# Patient Record
Sex: MALE | Race: WHITE | Employment: UNEMPLOYED | URBAN - METROPOLITAN AREA
[De-identification: names, ages, dates, MRNs, and addresses within clinical notes are randomized per-mention and may not be internally consistent; named-entity substitution may affect disease eponyms.]

---

## 2019-01-07 ENCOUNTER — OFFICE VISIT (OUTPATIENT)
Dept: FAMILY MEDICINE CLINIC | Age: 19
End: 2019-01-07

## 2019-01-07 VITALS
SYSTOLIC BLOOD PRESSURE: 132 MMHG | TEMPERATURE: 98.2 F | OXYGEN SATURATION: 98 % | WEIGHT: 315 LBS | RESPIRATION RATE: 18 BRPM | BODY MASS INDEX: 46.65 KG/M2 | DIASTOLIC BLOOD PRESSURE: 90 MMHG | HEART RATE: 104 BPM | HEIGHT: 69 IN

## 2019-01-07 DIAGNOSIS — G44.52 NEW PERSISTENT DAILY HEADACHE: Primary | ICD-10-CM

## 2019-01-07 DIAGNOSIS — Z23 ENCOUNTER FOR IMMUNIZATION: ICD-10-CM

## 2019-01-07 DIAGNOSIS — Z87.898 HISTORY OF SNORING: ICD-10-CM

## 2019-01-07 DIAGNOSIS — Z13.220 SCREENING, LIPID: ICD-10-CM

## 2019-01-07 DIAGNOSIS — E66.01 MORBID OBESITY (HCC): ICD-10-CM

## 2019-01-07 DIAGNOSIS — Z13.1 SCREENING FOR DIABETES MELLITUS: ICD-10-CM

## 2019-01-07 RX ORDER — TRAZODONE HYDROCHLORIDE 100 MG/1
TABLET ORAL
Refills: 1 | COMMUNITY
Start: 2018-11-21 | End: 2020-06-02

## 2019-01-07 RX ORDER — SERTRALINE HYDROCHLORIDE 50 MG/1
TABLET, FILM COATED ORAL
Refills: 1 | COMMUNITY
Start: 2018-11-21

## 2019-01-07 NOTE — PATIENT INSTRUCTIONS

## 2019-01-07 NOTE — PROGRESS NOTES
Chief Complaint Patient presents with Benja Prior Establish Care 1. Have you been to the ER, urgent care clinic since your last visit? Hospitalized since your last visit? No 
 
2. Have you seen or consulted any other health care providers outside of the [address] [place] [address] since your last visit? Include any pap smears or colon screening.  No

## 2019-01-07 NOTE — PROGRESS NOTES
HPI:  
Trung Lowry is a 22 y.o. male who presents to [facility]. Headache This patient is a 22 y.o. male with a chief complaint of headache. The patient has had history of headaches. The headache is typical of previous headaches. The patient says that the headache was gradual  in onset. The pain stayed. . The pain is moderate. The pain istemporal and is a persistant throbbing. The pain does not migrate. The headache is accompanied by photophobia and phonophobia. The headaches usually last hours. They are triggered by stress. Thereis not a family history of headaches. There is not a family history of cerebral aneurysm or SAH. Worse in the summer. Paternal Grandmother had a history of migraines. He is followed by [facility] psychiatric care for history of bipolar disorder. Has not been hospitalized since 2016 for bipolar disorder. He is currently in independent living. He is looking for work. He is currently smoking vapor cigarettes. He is not on a particular diet. He is not exercising. He reports a picky diet heavy in processed food and soda. He is accompanied by a  today. Current Outpatient Medications Medication Sig Dispense Refill  traZODone (DESYREL) 100 mg tablet TAKE 1 TABLET BY MOUTH TWICE A DAY  1  
 sertraline (ZOLOFT) 50 mg tablet TAKE 1 TABLET BY MOUTH EVERY DAY  1 No Known Allergies Patient Active Problem List  
Diagnosis Code  Psychiatric problem F99  
 Otitis media H66.90  Obesity, morbid ([facility] [address] E66.01 Past Medical History:  
Diagnosis Date  Otitis media  Psychiatric problem No past surgical history on file. No LMP for male patient. Family History Problem Relation Age of Onset  Migraines Paternal Grandmother Social History Socioeconomic History  Marital status: SINGLE Spouse name: Not on file  Number of children: Not on file  Years of education: Not on file  Highest education level: Not on file Social Needs  Financial resource strain: Not on file  Food insecurity - worry: Not on file  Food insecurity - inability: Not on file  Transportation needs - medical: Not on file  Transportation needs - non-medical: Not on file Occupational History  Not on file Tobacco Use  Smoking status: Current Some Day Smoker  Smokeless tobacco: Never Used  Tobacco comment: vap Substance and Sexual Activity  Alcohol use: No  
 Drug use: No  
 Sexual activity: No  
Other Topics Concern  Not on file Social History Narrative  Not on file ROS:  
Review of Systems Constitutional: Negative for fever, malaise/fatigue and weight loss. HENT: Negative for hearing loss. Eyes: Negative for blurred vision and pain. Respiratory: Negative for cough and shortness of breath. Cardiovascular: Negative for chest pain, palpitations and leg swelling. Gastrointestinal: Negative for abdominal pain, blood in stool, constipation, diarrhea and melena. Genitourinary: Negative for dysuria and hematuria. Musculoskeletal: Negative for joint pain. Skin: Negative for rash. Neurological: Negative for headaches. Psychiatric/Behavioral: Negative for depression. The patient is not nervous/anxious and does not have insomnia. Physical Exam:  
 
Visit Vitals /90 (BP 1 Location: Left arm, BP Patient Position: Sitting) Pulse (!) 104 Temp 98.2 °F (36.8 °C) (Oral) Resp 18 Ht 5' 9\" (1.753 m) Wt (!) 411 lb (186.4 kg) SpO2 98% BMI 60.69 kg/m² Vitals and Nurse Documentation reviewed. Physical Exam  
Constitutional: No distress. Morbidly obese. HENT:  
Right Ear: Tympanic membrane is not erythematous and not bulging. No middle ear effusion. Left Ear: Tympanic membrane is not erythematous and not bulging. No middle ear effusion. Nose: No rhinorrhea.  Right sinus exhibits no maxillary sinus tenderness and no frontal sinus tenderness. Left sinus exhibits no maxillary sinus tenderness and no frontal sinus tenderness. Mouth/Throat: No oropharyngeal exudate or posterior oropharyngeal erythema. Eyes: EOM and lids are normal.  
Cardiovascular: S1 normal and S2 normal. Exam reveals no gallop and no friction rub. No murmur heard. Pulmonary/Chest: Breath sounds normal. He has no wheezes. Lymphadenopathy:  
  He has no cervical adenopathy. Skin: Skin is warm and dry. Psychiatric: Mood and affect normal.  
 
 
 
Assessment/ Plan:  
[employer] were discussed including hours of operation, on-call providers, and MyChart. Diagnoses and all orders for this visit: 1. New persistent daily headache -     METABOLIC PANEL, COMPREHENSIVE Many lifestyle factors that we discussed improving including a reduction in caffeine and nicotine. Sleep study pending. 2. History of snoring 
-     SLEEP MEDICINE REFERRALfor evaluation of ROSANNA. 3. Screening for diabetes mellitus 
-     HEMOGLOBIN A1C WITH EAG 4. Screening, lipid -     LIPID PANEL 5. Morbid obesity ([facility] [address] -     METABOLIC PANEL, COMPREHENSIVE I have reviewed/discussed the above normal BMI with the patient. I have recommended the following interventions: dietary management education, guidance, and counseling, encourage exercise, monitor weight and prescribed dietary intake. Given written instructions as well. 6. Encounter for immunization 
-     INFLUENZA VIRUS VAC QUAD,SPLIT,PRESV FREE SYRINGE IM 
-     HUMAN PAPILLOMA VIRUS NONAVALENT HPV 3 DOSE IM (GARDASIL 9) -     [place] IMMUNIZ ADMIN,1 SINGLE/COMB VAC/TOXOID Discussed expected course/resolution/complications of diagnosis in detail with patient.   
Medication risks/benefits/costs/interactions/alternatives discussed with patient.   
Pt was given an after visit summary which includes diagnoses, current medications & vitals.   
Pt expressed understanding with the diagnosis and plan Follow-up Disposition: 
Return in about 8 weeks (around 3/4/2019).

## 2019-03-18 ENCOUNTER — OFFICE VISIT (OUTPATIENT)
Dept: FAMILY MEDICINE CLINIC | Age: 19
End: 2019-03-18

## 2019-03-18 VITALS
TEMPERATURE: 98.3 F | RESPIRATION RATE: 16 BRPM | HEART RATE: 98 BPM | HEIGHT: 69 IN | SYSTOLIC BLOOD PRESSURE: 141 MMHG | BODY MASS INDEX: 46.65 KG/M2 | OXYGEN SATURATION: 97 % | WEIGHT: 315 LBS | DIASTOLIC BLOOD PRESSURE: 84 MMHG

## 2019-03-18 DIAGNOSIS — M54.50 ACUTE MIDLINE LOW BACK PAIN WITHOUT SCIATICA: Primary | ICD-10-CM

## 2019-03-18 DIAGNOSIS — E66.01 OBESITY, MORBID (HCC): ICD-10-CM

## 2019-03-18 RX ORDER — DICLOFENAC SODIUM 75 MG/1
75 TABLET, DELAYED RELEASE ORAL
Qty: 30 TAB | Refills: 0 | Status: SHIPPED | OUTPATIENT
Start: 2019-03-18

## 2019-03-18 NOTE — PROGRESS NOTES
Morgan Espinoza is a 22 y.o. male    Exam Rm: 28    Chief Complaint   Patient presents with    Back Pain     Pt is here for a f/u for back pain. 1. Have you been to the ER, urgent care clinic since your last visit? Hospitalized since your last visit? No     2. Have you seen or consulted any other health care providers outside of the [address] [place] [address] since your last visit? Include any pap smears or colon screening.   No      Health Maintenance Due   Topic Date Due    DTaP/Tdap/Td series (1 - Tdap) 10/06/2006    Pneumococcal 19-64 Medium Risk (1 of 1 - PPSV23) 10/06/2018       Visit Vitals  /84 (BP 1 Location: Left arm, BP Patient Position: Sitting)   Pulse 98   Temp 98.3 °F (36.8 °C) (Oral)   Resp 16   Ht 5' 9\" (1.753 m)   Wt (!) 432 lb (196 kg)   SpO2 97%   BMI 63.80 kg/m²

## 2019-03-18 NOTE — PROGRESS NOTES
HISTORY OF PRESENT ILLNESS  Shirley Mccray is a 22 y.o. male. HPI  Accompanied by aid for evaluation of low back pain. Reports midline low back pain on and off for several months. Aggravated by prolonged standing, works at [employer], has to stand for 4 hours/day. Sx relieved with sitting/lying down. Tried icy hot patches with little sx relief. Obese. Past medical history, social history, family history and medications were reviewed and updated. Blood pressure 141/84, pulse 98, temperature 98.3 °F (36.8 °C), temperature source Oral, resp. rate 16, height 5' 9\" (1.753 m), weight (!) 432 lb (196 kg), SpO2 97 %. Review of Systems   Musculoskeletal: Positive for back pain. Neurological: Negative for tingling and sensory change. All other systems reviewed and are negative. Physical Exam   Constitutional:   Obese. Musculoskeletal:        Lumbar back: He exhibits tenderness (lumbar sacral region). He exhibits no edema. Neurological: He has normal strength. No sensory deficit. Negative SLR testing. ASSESSMENT and PLAN  Diagnoses and all orders for this visit:    1. Acute midline low back pain without sciatica  -     diclofenac EC (VOLTAREN) 75 mg EC tablet; Take 1 Tab by mouth two (2) times daily as needed for Pain. Moist heat to affected area tid x 20 minutes. Avoid strenuous lifting, pushing, pulling. Avoid twisting and bending. Limit prolonged standing  Low back stretching, stabilization and strengthening exercises reviewed-handout given. Voltaren as above. Medication risks, benefits and potential side effects were reviewed. Topical analgesia as directed. Consider PT if sx worsen or FTI. 2. Obesity, morbid ([address]  Weight loss recommendations given. Strongly advised walking x 20 minutes 3-4x week.

## 2019-03-18 NOTE — LETTER
NOTIFICATION RETURN TO WORK / SCHOOL 
 
3/18/2019 4:06 PM 
 
Mr. Ella Rodriguez [address] [facility]. [address] [facility] [address] To Whom It May Concern: 
 
Ella Rodriguez is currently under the care of [facility]. It is recommended that he work no more than 2 consecutive days due to back problem. If there are questions or concerns please have the patient contact our office.  
 
 
 
Sincerely, 
 
 
Bea Green, NP

## 2019-03-18 NOTE — PATIENT INSTRUCTIONS

## 2019-04-30 ENCOUNTER — OFFICE VISIT (OUTPATIENT)
Dept: SLEEP MEDICINE | Age: 19
End: 2019-04-30

## 2019-04-30 VITALS
HEIGHT: 69 IN | HEART RATE: 101 BPM | BODY MASS INDEX: 46.65 KG/M2 | WEIGHT: 315 LBS | DIASTOLIC BLOOD PRESSURE: 77 MMHG | SYSTOLIC BLOOD PRESSURE: 114 MMHG | OXYGEN SATURATION: 98 %

## 2019-04-30 DIAGNOSIS — Z86.59 H/O: DEPRESSION: ICD-10-CM

## 2019-04-30 DIAGNOSIS — E66.2 HYPOVENTILATION ASSOCIATED WITH OBESITY (HCC): ICD-10-CM

## 2019-04-30 DIAGNOSIS — G47.33 OSA (OBSTRUCTIVE SLEEP APNEA): Primary | ICD-10-CM

## 2019-04-30 NOTE — PATIENT INSTRUCTIONS
[address] [facility] [place]. [place], [address]  Tel.  [phone]  Fax. [address] [facility] [address]  [place], [address] [facility]  Tel.  [phone]  Fax. [phone] [address]  Tel.  [phone]  Fax. [phone]     Sleep Apnea: After Your Visit  Your Care Instructions  Sleep apnea occurs when you frequently stop breathing for 10 seconds or longer during sleep. It can be mild to severe, based on the number of times per hour that you stop breathing or have slowed breathing. Blocked or narrowed airways in your nose, mouth, or throat can cause sleep apnea. Your airway can become blocked when your throat muscles and tongue relax during sleep. Sleep apnea is common, occurring in 1 out of 20 individuals. Individuals having any of the following characteristics should be evaluated and treated right away due to high risk and detrimental consequences from untreated sleep apnea:  1. Obesity  2. Congestive Heart failure  3. Atrial Fibrillation  4. Uncontrolled Hypertension  5. Type II Diabetes  6. Night-time Arrhythmias  7. Stroke  8. Pulmonary Hypertension  9. High-risk Driving Populations (pilots, truck drivers, etc.)  10. Patients Considering Weight-loss Surgery    How do you know you have sleep apnea? You probably have sleep apnea if you answer 'yes' to 3 or more of the following questions:  S - Have you been told that you Snore? T - Are you often Tired during the day? O - Has anyone Observed you stop breathing while sleeping? P- Do you have (or are being treated for) high blood Pressure? B - Are you obese (Body Mass Index > 35)? A - Is your Age 48years old or older? N - Is your Neck size greater than 16 inches? G - Are you male Gender? A sleep physician can prescribe a breathing device that prevents tissues in the throat from blocking your airway.  Or your doctor may recommend using a dental device (oral breathing device) to help keep your airway open. In some cases, surgery may be needed to remove enlarged tissues in the throat. Follow-up care is a key part of your treatment and safety. Be sure to make and go to all appointments, and call your doctor if you are having problems. It's also a good idea to know your test results and keep a list of the medicines you take. How can you care for yourself at home? · Lose weight, if needed. It may reduce the number of times you stop breathing or have slowed breathing. · Go to bed at the same time every night. · Sleep on your side. It may stop mild apnea. If you tend to roll onto your back, sew a pocket in the back of your pajama top. Put a tennis ball into the pocket, and stitch the pocket shut. This will help keep you from sleeping on your back. · Avoid alcohol and medicines such as sleeping pills and sedatives before bed. · Do not smoke. Smoking can make sleep apnea worse. If you need help quitting, talk to your doctor about stop-smoking programs and medicines. These can increase your chances of quitting for good. · Prop up the head of your bed 4 to 6 inches by putting bricks under the legs of the bed. · Treat breathing problems, such as a stuffy nose, caused by a cold or allergies. · Use a continuous positive airway pressure (CPAP) breathing machine if lifestyle changes do not help your apnea and your doctor recommends it. The machine keeps your airway from closing when you sleep. · If CPAP does not help you, ask your doctor whether you should try other breathing machines. A bilevel positive airway pressure machine has two types of air pressureâone for breathing in and one for breathing out. Another device raises or lowers air pressure as needed while you breathe. · If your nose feels dry or bleeds when using one of these machines, talk with your doctor about increasing moisture in the air. A humidifier may help.   · If your nose is runny or stuffy from using a breathing machine, talk with your doctor about using decongestants or a corticosteroid nasal spray. When should you call for help? Watch closely for changes in your health, and be sure to contact your doctor if:  · You still have sleep apnea even though you have made lifestyle changes. · You are thinking of trying a device such as CPAP. · You are having problems using a CPAP or similar machine. Where can you learn more? Go to Wingz. Enter V280 in the search box to learn more about \"Sleep Apnea: After Your Visit. \"   © 5746-7186 Healthwise, Incorporated. Care instructions adapted under license by [employer] (which disclaims liability or warranty for this information). This care instruction is for use with your licensed healthcare professional. If you have questions about a medical condition or this instruction, always ask your healthcare professional. Armida Lawson any warranty or liability for your use of this information. PROPER SLEEP HYGIENE    What to avoid  · Do not have drinks with caffeine, such as coffee or black tea, for 8 hours before bed. · Do not smoke or use other types of tobacco near bedtime. Nicotine is a stimulant and can keep you awake. · Avoid drinking alcohol late in the evening, because it can cause you to wake in the middle of the night. · Do not eat a big meal close to bedtime. If you are hungry, eat a light snack. · Do not drink a lot of water close to bedtime, because the need to urinate may wake you up during the night. · Do not read or watch TV in bed. Use the bed only for sleeping and sexual activity. What to try  · Go to bed at the same time every night, and wake up at the same time every morning. Do not take naps during the day. · Keep your bedroom quiet, dark, and cool. · Get regular exercise, but not within 3 to 4 hours of your bedtime. .  · Sleep on a comfortable pillow and mattress.   · If watching the clock makes you anxious, turn it facing away from you so you cannot see the time. · If you worry when you lie down, start a worry book. Well before bedtime, write down your worries, and then set the book and your concerns aside. · Try meditation or other relaxation techniques before you go to bed. · If you cannot fall asleep, get up and go to another room until you feel sleepy. Do something relaxing. Repeat your bedtime routine before you go to bed again. · Make your house quiet and calm about an hour before bedtime. Turn down the lights, turn off the TV, log off the computer, and turn down the volume on music. This can help you relax after a busy day. Drowsy Driving  The [address] [place] [address] Road Traffic Safety Administration cites drowsiness as a causing factor in more than 589,520 police reported crashes annually, resulting in 76,000 injuries and 1,500 deaths. Other surveys suggest 55% of people polled have driven while drowsy in the past year, 23% had fallen asleep but not crashed, 3% crashed, and 2% had and accident due to drowsy driving. Who is at risk? Young Drivers: One study of drowsy driving accidents states that 55% of the drivers were under 25 years. Of those, 75% were male. Shift Workers and Travelers: People who work overnight or travel across time zones frequently are at higher risk of experiencing Circadian Rhythm Disorders. They are trying to work and function when their body is programed to sleep. Sleep Deprived: Lack of sleep has a serious impact on your ability to pay attention or focus on a task. Consistently getting less than the average of 8 hours your body needs creates partial or cumulative sleep deprivation. Untreated Sleep Disorders: Sleep Apnea, Narcolepsy, R.L.S., and other sleep disorders (untreated) prevent a person from getting enough restful sleep. This leads to excessive daytime sleepiness and increases the risk for drowsy driving accidents by up to 7 times.   Medications / Alcohol: Even over the counter medications can cause drowsiness. Medications that impair a drivers attention should have a warning label. Alcohol naturally makes you sleepy and on its own can cause accidents. Combined with excessive drowsiness its effects are amplified. Signs of Drowsy Driving:   * You don't remember driving the last few miles   * You may drift out of your nahomi   * You are unable to focus and your thoughts wander   * You may yawn more often than normal   * You have difficulty keeping your eyes open / nodding off   * Missing traffic signs, speeding, or tailgating  Prevention-   Good sleep hygiene, lifestyle and behavioral choices have the most impact on drowsy driving. There is no substitute for sleep and the average person requires 8 hours nightly. If you find yourself driving drowsy, stop and sleep. Consider the sleep hygiene tips provided during your visit as well. Medication Refill Policy: Refills for all medications require 1 week advance notice. Please have your pharmacy fax a refill request. We are unable to fax, or call in \"controled substance\" medications and you will need to pick these prescriptions up from our office. [employer] Activation    Thank you for requesting access to [employer]. Please follow the instructions below to securely access and download your online medical record. [employer] allows you to send messages to your doctor, view your test results, renew your prescriptions, schedule appointments, and more. How Do I Sign Up? 1. In your internet browser, go to [email or web address]. 2. Click on the First Time User? Click Here link in the Sign In box. You will see the New Member Sign Up page. 3. Enter your [employer] Access Code exactly as it appears below. You will not need to use this code after youve completed the sign-up process. If you do not sign up before the expiration date, you must request a new code.     [employer] Access Code: [email or web address]  Expires: 5/2/2019  3:44 PM (This is the date your [employer] access code will )    4. Enter the last four digits of your Social Security Number (xxxx) and Date of Birth (mm/dd/yyyy) as indicated and click Submit. You will be taken to the next sign-up page. 5. Create a [email or web address] ID. This will be your [employer]Leap login ID and cannot be changed, so think of one that is secure and easy to remember. 6. Create a [employer]Leap password. You can change your password at any time. 7. Enter your Password Reset Question and Answer. This can be used at a later time if you forget your password. 8. Enter your e-mail address. You will receive e-mail notification when new information is available in [address] 9. Click Sign Up. You can now view and download portions of your medical record. 10. Click the Download Summary menu link to download a portable copy of your medical information. Additional Information    If you have questions, please call [phone]. Remember, [employer] is NOT to be used for urgent needs. For medical emergencies, dial 911.

## 2019-04-30 NOTE — PROGRESS NOTES
[facility] [place]. [place], [address]  Tel.  [phone]  Fax. 100 [facility] 60  [place], [address] [facility]  Tel.  [phone]  Fax. [phone] [address] [facility] Narendra Piper   Tel.  [phone]  Fax. [phone]         Subjective:      Rere Fisher is an 22 y.o. male referred for evaluation for a sleep disorder. He currently lives independently and is accompanied by Mr. Javier Cruz from [facility]. He complains of snoring associated with snorting, periods of not breathing. Symptoms began several years ago, unchanged since that time. He usually is unable to fall asleep for up to 2 to 3 hours after getting into bed and during this period he will view television or play a video game in bed. Family or house members note snoring. He denies completely or partially paralyzed while falling asleep or waking up. Rere Fisher does wake up frequently at night. He is not bothered by waking up too early and left unable to get back to sleep. He actually sleeps about 14 hours at night and wakes up about 2 times during the night. He does not work shifts:  .   Romi Orona indicates he does not get too little sleep at night. His bedtime is variable 7:00 pm to 5:00 am. He awakens at a variable hour between 11:00 am to 7:00 pm. He does take naps. He takes 2 naps a week lasting 1-2 Hour(s). He has the following observed behaviors: Pauses in breathing; Nightmares. Other remarks: vivid dreams, waking with a gasp or snort    [place] Sleepiness Score: 12 which reflect moderate daytime drowsiness.     No Known Allergies      Current Outpatient Medications:     traZODone (DESYREL) 100 mg tablet, TAKE 1 TABLET BY MOUTH TWICE A DAY, Disp: , Rfl: 1    diclofenac EC (VOLTAREN) 75 mg EC tablet, Take 1 Tab by mouth two (2) times daily as needed for Pain., Disp: 30 Tab, Rfl: 0    sertraline (ZOLOFT) 50 mg tablet, TAKE 1 TABLET BY MOUTH EVERY DAY, Disp: , Rfl: 1     He  has a past medical history of Anxiety, Depression, Migraine, Otitis media, Psychiatric disorder, and Psychiatric problem. He  has no past surgical history on file. He family history includes Migraines in his paternal grandmother. He  reports that he has been smoking. He has never used smokeless tobacco. He reports that he does not drink alcohol or use drugs. Review of Systems:  Constitutional: significant weight gain - 100 lbs in past year  Eyes:  No blurred vision. CVS:  No significant chest pain  Pulm:  No significant shortness of breath  GI:  No significant nausea or vomiting  :  No significant nocturia  Musculoskeletal:  No significant joint pain at night  Skin:  No significant rashes  Neuro:  No significant dizziness   Psych:  No active mood issues    Sleep Review of Systems: notable for difficulty falling asleep; frequent awakenings at night;  regular dreaming noted; occasional nightmares ; frequent early morning headaches; no memory problems; no concentration issues; no history of any automobile or occupational accidents due to daytime drowsiness. Objective:     Visit Vitals  /77 (BP 1 Location: Left arm, BP Patient Position: Sitting)   Pulse (!) 101   Ht 5' 9\" (1.753 m)   Wt (!) 426 lb (193.2 kg)   SpO2 98%   BMI 62.91 kg/m²         General:   Not in acute distress   Eyes:  Anicteric sclerae, no obvious strabismus   Nose:  No obvious nasal septum deviation    Oropharynx:   Class 4 oropharyngeal outlet, thick tongue base, uvula could not be seen due to low-lying soft palate, narrow tonsilo-pharyngeal pilars   Tonsils:   tonsils are not seen due to low-lying soft palate   Neck:   Neck circ.  in \"inches\": 19; midline trachea   Chest/Lungs:  Equal lung expansion, clear on auscultation    CVS:  Normal rate, regular rhythm; no JVD   Skin:  Warm to touch; no obvious rashes   Neuro:  No focal deficits ; no obvious tremor    Psych:  Normal affect,  normal countenance;          Assessment: ICD-10-CM ICD-9-CM    1. ROSANNA (obstructive sleep apnea) G47.33 327.23 POLYSOMNOGRAPHY 1 NIGHT   2. Hypoventilation associated with obesity (HCC) E66.2 278.03 POLYSOMNOGRAPHY 1 NIGHT      BLOOD GAS, ARTERIAL   3. BMI 60.0-69.9, adult ([facility] Z68.44 V85.44    4. H/O: depression Z86.59 V11.8          Plan:     * The patient currently has a High Risk for having sleep apnea. STOP-BANG score 6.  * Sleep testing was ordered for initial evaluation. Blood gas analysis ordered but declined by patient. * He was provided information on sleep apnea including coresponding risk factors and the importance of proper treatment. * Treatment options if indicated were reviewed today. Patient agrees to a trial of PAP therapy if indicated. * Counseling was provided regarding proper sleep hygiene (including effect of light on sleep), paradoxical intention, stimulus control, sleep environment safety and safe driving. * Effect of sleep disturbance on weight was reviewed. We have recommended a dedicated weight loss through appropriate diet and an exercise regiment as significant weight reduction has been shown to reduce severity of obstructive sleep apnea. * Patient agrees to telephone [phone]  follow-up by myself or lead sleep technologist shortly after sleep study to review results and plan final management.     (patient has given permission for a message to be left regarding test results and further management if patient cannot be cannot be reached directly). Thank you for allowing us to participate in your patient's medical care. We'll keep you updated on these investigations. Mariela Vargas MD, FAASM  Electronically signed.  04/30/19

## 2019-05-28 ENCOUNTER — HOSPITAL ENCOUNTER (EMERGENCY)
Age: 19
Discharge: HOME | End: 2019-05-28
Attending: EMERGENCY MEDICINE
Payer: MEDICAID

## 2019-05-28 VITALS
SYSTOLIC BLOOD PRESSURE: 166 MMHG | RESPIRATION RATE: 16 BRPM | DIASTOLIC BLOOD PRESSURE: 76 MMHG | HEART RATE: 94 BPM | WEIGHT: 315 LBS | TEMPERATURE: 98.7 F | OXYGEN SATURATION: 96 % | BODY MASS INDEX: 46.65 KG/M2 | HEIGHT: 69 IN

## 2019-05-28 DIAGNOSIS — F41.1 ANXIETY STATE: Primary | ICD-10-CM

## 2019-05-28 PROCEDURE — 99284 EMERGENCY DEPT VISIT MOD MDM: CPT

## 2019-05-28 PROCEDURE — 90791 PSYCH DIAGNOSTIC EVALUATION: CPT

## 2019-05-28 RX ORDER — HYDROXYZINE 50 MG/1
50 TABLET, FILM COATED ORAL
Qty: 20 TAB | Refills: 0 | Status: SHIPPED | OUTPATIENT
Start: 2019-05-28 | End: 2019-06-07

## 2019-05-28 NOTE — ED NOTES
Discharge instructions given to pt. All questions answered and pt verbalized understanding. V/S stable @ time of discharge. Pt ambulatory out of unit.  Pt transported home by friend
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
## 2019-05-28 NOTE — ED TRIAGE NOTES
Patient arrived via [facility] officer voluntarily. Patient reports he wants to talk to someone. Patient reports homicidal and suicidal flashbacks. Patient denies current feelings of homicidal or suicidal ideations. Patient reports he called police tonight because he had access to his games and Netflix removed and it is his way of keeping his mind occupied. Patient reports his uncle called and stated he aunt . Patient is in an independent living program.  Patient refused to change into a gown. Officer Lahoma Meckel attempted to diffuse situation. Patient agreed to [employer] placing his belongings behind the nurse's station and patting him down. Patient denies hallucinations or delusions.

## 2019-05-28 NOTE — DISCHARGE INSTRUCTIONS

## 2019-05-28 NOTE — ED NOTES
Pt refusing to change into hospital gown. Pt patted down by [employer]. Personal effects from pockets placed into pt belonging bag and secured at nurses station.  Pt denies SI/HI
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
## 2019-06-18 ENCOUNTER — TELEPHONE (OUTPATIENT)
Dept: SLEEP MEDICINE | Age: 19
End: 2019-06-18

## 2019-06-18 NOTE — TELEPHONE ENCOUNTER
Auth pending a P2P through [facility] [phone]. Study scheduled at [facility] for tomorrow night, 6/19/19. Please advise.

## 2019-06-18 NOTE — TELEPHONE ENCOUNTER
Phoned the patient to inform he that he will need to have blood work done before the provider at [address] [place] [address] would approve his PSG. He stated that he wanted to cancel his study and will call back to reschedule.

## 2019-06-18 NOTE — TELEPHONE ENCOUNTER
P2P review done with [facility]'s provider metabolic screen to document bicarbonate levels recommended. If bicarbonate level is > 29 attended testing may be approved on line if not patient would need to do an HSAT. Encounter Diagnosis   Name Primary?     Sleep-related hypoventilation Yes     Orders Placed This Encounter    METABOLIC PANEL, BASIC

## 2019-08-13 ENCOUNTER — TELEPHONE (OUTPATIENT)
Dept: SLEEP MEDICINE | Age: 19
End: 2019-08-13

## 2019-08-13 DIAGNOSIS — R06.89 HYPOVENTILATION: Primary | ICD-10-CM

## 2019-08-13 NOTE — TELEPHONE ENCOUNTER
Called [employer] to check on status of prior auth for CPT code 85664, spoke with Mckenzie still pending P2P. P2P can be done by calling [phone] no later than 6pm CST time today. Patient is scheduled for sleep study on 8/14/19 at [facility].  Please advise

## 2019-08-13 NOTE — TELEPHONE ENCOUNTER
Spoke with Dr. Rhett Gabriel, insurance denied sleep study for tomorrow pending additional blood work. Historically, patient did not want to proceed with the ABG ordered by Dr. Rhett Gabriel but he put in an order for a BMP. If he can get this done today, we can get that to the director and revisit getting him approved before tomorrow's sleep study. I left this all on the patients cell voicemail. If patient doesn't want to get the blood work done, we will cancel the study and request authorization for an HSAT and Dr. Rhett Gabriel will put an order in for that at that time. I'll continue to try and reach him.

## 2019-08-13 NOTE — TELEPHONE ENCOUNTER
----- Message from Devang Torres sent at 8/13/2019  9:23 AM EDT -----  Regarding: P2P needed  Hi Dr. Robles Forward,    Patient is scheduled for sleep study at [facility] for tomorrow night 8/14 and prior Nicaraa is pending P2P. P2P can be done by calling [phone] by 6pm CST today.  Please ref #[id number]    Thank you     Carol Wilkins

## 2019-08-13 NOTE — TELEPHONE ENCOUNTER
P2P review done, checking metabolic screen recommended to determine need for attended testing. Encounter Diagnosis   Name Primary?     Hypoventilation Yes     Orders Placed This Encounter    METABOLIC PANEL, BASIC

## 2019-08-14 ENCOUNTER — DOCUMENTATION ONLY (OUTPATIENT)
Dept: SLEEP MEDICINE | Age: 19
End: 2019-08-14

## 2019-08-14 NOTE — PROGRESS NOTES
Spoke with patient informed him sleep study has been cancelled due to unable to obtain prior auth and we need blood work to be done asap and once completed and we have received results we will then get him rescheduled and try again with auth for sleep study.  Patient verbalized understanding

## 2019-08-29 ENCOUNTER — TELEPHONE (OUTPATIENT)
Dept: SLEEP MEDICINE | Age: 19
End: 2019-08-29

## 2019-08-29 DIAGNOSIS — G47.33 OSA (OBSTRUCTIVE SLEEP APNEA): Primary | ICD-10-CM

## 2019-08-29 NOTE — TELEPHONE ENCOUNTER
Spoke with patient discussed need to proceed with testing, HSAT and metabolic screen ordered. Encounter Diagnoses   Name Primary?     ROSANNA (obstructive sleep apnea) Yes    Sleep-related hypoventilation        Orders Placed This Encounter    METABOLIC PANEL, BASIC    SLEEP STUDY UNATTENDED, 4 CHANNEL     Order Specific Question:   Reason for Exam     Answer:   rosanna

## 2019-10-04 ENCOUNTER — HOSPITAL ENCOUNTER (OUTPATIENT)
Dept: PULMONOLOGY | Age: 19
Discharge: HOME | End: 2019-10-04
Attending: INTERNAL MEDICINE
Payer: MEDICAID

## 2019-10-04 DIAGNOSIS — E66.2 HYPOVENTILATION ASSOCIATED WITH OBESITY (HCC): ICD-10-CM

## 2019-10-04 LAB
ARTERIAL PATENCY WRIST A: YES
BASE DEFICIT BLD-SCNC: 1 MMOL/L
BDY SITE: ABNORMAL
GAS FLOW.O2 O2 DELIVERY SYS: ABNORMAL L/MIN
HCO3 BLD-SCNC: 23.8 MMOL/L (ref 22–26)
O2/TOTAL GAS SETTING VFR VENT: 21 %
PCO2 BLD: 37 MMHG (ref 35–45)
PH BLD: 7.42 [PH] (ref 7.35–7.45)
PO2 BLD: 108 MMHG (ref 80–100)
SAO2 % BLD: 98 % (ref 92–97)
SPECIMEN TYPE: ABNORMAL
TOTAL RESP. RATE, ITRR: 12

## 2019-10-04 PROCEDURE — 82803 BLOOD GASES ANY COMBINATION: CPT

## 2019-10-04 PROCEDURE — 36600 WITHDRAWAL OF ARTERIAL BLOOD: CPT

## 2019-11-01 ENCOUNTER — TELEPHONE (OUTPATIENT)
Dept: SLEEP MEDICINE | Age: 19
End: 2019-11-01

## 2019-11-01 NOTE — TELEPHONE ENCOUNTER
Spoke to Jenny, patients , and explained error. Patient should have been scheduled for an HSAT. Initiated PA and [facility]. Faxed along with [employer] request form. Left message with Jenny regarding the details of Gene.

## 2020-01-16 ENCOUNTER — HOSPITAL ENCOUNTER (OUTPATIENT)
Dept: GENERAL RADIOLOGY | Age: 20
Discharge: HOME | End: 2020-01-16
Payer: MEDICAID

## 2020-01-16 ENCOUNTER — OFFICE VISIT (OUTPATIENT)
Dept: FAMILY MEDICINE CLINIC | Age: 20
End: 2020-01-16

## 2020-01-16 VITALS
BODY MASS INDEX: 46.65 KG/M2 | TEMPERATURE: 98.4 F | HEART RATE: 99 BPM | RESPIRATION RATE: 18 BRPM | DIASTOLIC BLOOD PRESSURE: 87 MMHG | SYSTOLIC BLOOD PRESSURE: 139 MMHG | WEIGHT: 315 LBS | HEIGHT: 69 IN | OXYGEN SATURATION: 96 %

## 2020-01-16 DIAGNOSIS — M54.50 LUMBAR BACK PAIN: Primary | ICD-10-CM

## 2020-01-16 DIAGNOSIS — M54.50 LUMBAR BACK PAIN: ICD-10-CM

## 2020-01-16 PROCEDURE — 72100 X-RAY EXAM L-S SPINE 2/3 VWS: CPT

## 2020-01-16 RX ORDER — IBUPROFEN 200 MG
TABLET ORAL
COMMUNITY

## 2020-01-16 RX ORDER — MELOXICAM 15 MG/1
15 TABLET ORAL DAILY
Qty: 7 TAB | Refills: 0 | Status: SHIPPED | OUTPATIENT
Start: 2020-01-16

## 2020-01-16 RX ORDER — ACETAMINOPHEN 500 MG
TABLET ORAL
COMMUNITY

## 2020-01-16 NOTE — PROGRESS NOTES
Outbound call to patient at [phone] no answer left a detailed message informing him x-ray was normal prescription for Meloxicam sent to pharmacy, instructed to call with any questions.

## 2020-01-16 NOTE — PROGRESS NOTES
caloAssessment/Plan:     Diagnoses and all orders for this visit:    1. Lumbar back pain  -     XR SPINE LUMB 2 OR 3 V; Future  Xray pending. Treatment will be based on results. Consider trial Meloxicam.     2. BMI 60.0-69.9, adult ([address]    I have reviewed/discussed the above normal BMI with the patient. I have recommended the following interventions: dietary management education, guidance, and counseling, encourage exercise, monitor weight and prescribed dietary intake. Given written instructions as well. Set goals to move towards 0 calorie drinks and be active three days a week. Encouraged to reduce processed snack foods. Follow-up and Dispositions    · Return in about 4 weeks (around 2/13/2020) for Follow Up. Discussed expected course/resolution/complications of diagnosis in detail with patient. Medication risks/benefits/costs/interactions/alternatives discussed with patient. Pt was given after visit summary which includes diagnoses, current medications & vitals. Pt expressed understanding with the diagnosis and plan          Subjective:      Willard Dobson is a 20 y.o. male who presents for had concerns including Back Pain (mid to low, more after sitting ); Weight Management; and Headache (3-4 a week ). Reports a history of lumbar back with left radiculopathy for 1 year. Has not attempted treatment in the past.  This is his first evaluation. Exacerbation prevents prolonged standing. Interested in weight loss. Has attempted self directed weight loss without improvement. He is sedentary. He is not following a specific diet plan. He lives alone. He is followed by [employer].        Patient Active Problem List   Diagnosis Code    Psychiatric problem F99    Otitis media H66.90    Obesity, morbid ([facility]) E66.01       Current Outpatient Medications   Medication Sig Dispense Refill    acetaminophen (TYLENOL EXTRA STRENGTH) 500 mg tablet Take  by mouth every six (6) hours as needed for Pain.  ibuprofen (MOTRIN) 200 mg tablet Take  by mouth.  diclofenac EC (VOLTAREN) 75 mg EC tablet Take 1 Tab by mouth two (2) times daily as needed for Pain. 30 Tab 0    traZODone (DESYREL) 100 mg tablet TAKE 1 TABLET BY MOUTH TWICE A DAY  1    sertraline (ZOLOFT) 50 mg tablet TAKE 1 TABLET BY MOUTH EVERY DAY  1       No Known Allergies    ROS:   Review of Systems   Constitutional: Negative for malaise/fatigue. Eyes: Negative for blurred vision. Respiratory: Negative for shortness of breath. Cardiovascular: Negative for chest pain. Musculoskeletal: Positive for back pain. Objective:     Visit Vitals  /87   Pulse 99   Temp 98.4 °F (36.9 °C) (Oral)   Resp 18   Ht 5' 9\" (1.753 m)   Wt (!) 463 lb 6.4 oz (210.2 kg)   SpO2 96%   BMI 68.43 kg/m²       Vitals and Nurse Documentation reviewed. Physical Exam  Constitutional:       General: He is not in acute distress. Appearance: He is obese. Cardiovascular:      Heart sounds: S1 normal and S2 normal. No murmur. No friction rub. No gallop. Pulmonary:      Effort: No respiratory distress. Breath sounds: Normal breath sounds. Musculoskeletal:      Lumbar back: He exhibits decreased range of motion and pain. He exhibits no tenderness and no spasm. Skin:     General: Skin is warm and dry.    Psychiatric:         Mood and Affect: Mood and affect normal.

## 2020-01-16 NOTE — PROGRESS NOTES
Chief Complaint   Patient presents with    Back Pain     mid to low, more after sitting     Weight Management    Headache     3-4 a week      1. Have you been to the ER, urgent care clinic since your last visit? Hospitalized since your last visit? No    2. Have you seen or consulted any other health care providers outside of the [address] [place] [address] since your last visit? Include any pap smears or colon screening.  No

## 2020-01-16 NOTE — PATIENT INSTRUCTIONS
Back Pain: Care Instructions Your Care Instructions Back pain has many possible causes. It is often related to problems with muscles and ligaments of the back. It may also be related to problems with the nerves, discs, or bones of the back. Moving, lifting, standing, sitting, or sleeping in an awkward way can strain the back. Sometimes you don't notice the injury until later. Arthritis is another common cause of back pain. Although it may hurt a lot, back pain usually improves on its own within several weeks. Most people recover in 12 weeks or less. Using good home treatment and being careful not to stress your back can help you feel better sooner. Follow-up care is a key part of your treatment and safety. Be sure to make and go to all appointments, and call your doctor if you are having problems. It's also a good idea to know your test results and keep a list of the medicines you take. How can you care for yourself at home? · Sit or lie in positions that are most comfortable and reduce your pain. Try one of these positions when you lie down: ? Lie on your back with your knees bent and supported by large pillows. ? Lie on the floor with your legs on the seat of a sofa or chair. ? Lie on your side with your knees and hips bent and a pillow between your legs. ? Lie on your stomach if it does not make pain worse. · Do not sit up in bed, and avoid soft couches and twisted positions. Bed rest can help relieve pain at first, but it delays healing. Avoid bed rest after the first day of back pain. · Change positions every 30 minutes. If you must sit for long periods of time, take breaks from sitting. Get up and walk around, or lie in a comfortable position. · Try using a heating pad on a low or medium setting for 15 to 20 minutes every 2 or 3 hours. Try a warm shower in place of one session with the heating pad. · You can also try an ice pack for 10 to 15 minutes every 2 to 3 hours. Put a thin cloth between the ice pack and your skin. · Take pain medicines exactly as directed. ? If the doctor gave you a prescription medicine for pain, take it as prescribed. ? If you are not taking a prescription pain medicine, ask your doctor if you can take an over-the-counter medicine. · Take short walks several times a day. You can start with 5 to 10 minutes, 3 or 4 times a day, and work up to longer walks. Walk on level surfaces and avoid hills and stairs until your back is better. · Return to work and other activities as soon as you can. Continued rest without activity is usually not good for your back. · To prevent future back pain, do exercises to stretch and strengthen your back and stomach. Learn how to use good posture, safe lifting techniques, and proper body mechanics. When should you call for help? Call your doctor now or seek immediate medical care if: 
  · You have new or worsening numbness in your legs.  
  · You have new or worsening weakness in your legs. (This could make it hard to stand up.)  
  · You lose control of your bladder or bowels.  
 Watch closely for changes in your health, and be sure to contact your doctor if: 
  · You have a fever, lose weight, or don't feel well.  
  · You do not get better as expected. Where can you learn more? Go to http://zamzam-fartun.info/. Enter K043 in the search box to learn more about \"Back Pain: Care Instructions. \" Current as of: June 26, 2019 Content Version: 12.2 © 2821-6116 [employer]. Care instructions adapted under license by [employer] (which disclaims liability or warranty for this information). If you have questions about a medical condition or this instruction, always ask your healthcare professional. [address] any warranty or liability for your use of this information.

## 2020-01-20 ENCOUNTER — TELEPHONE (OUTPATIENT)
Dept: FAMILY MEDICINE CLINIC | Age: 20
End: 2020-01-20

## 2020-01-20 RX ORDER — IBUPROFEN 200 MG
TABLET ORAL
Status: CANCELLED | OUTPATIENT
Start: 2020-01-20

## 2020-01-20 RX ORDER — ACETAMINOPHEN 500 MG
TABLET ORAL
Status: CANCELLED | OUTPATIENT
Start: 2020-01-20

## 2020-01-20 NOTE — TELEPHONE ENCOUNTER
Outbound call to [facility] patients . Left message to return call back to the office. Need to clarify pharmacy. Which [facility] location medication refills should be sent to. When call is returned please clarify pharmacy.

## 2020-01-20 NOTE — TELEPHONE ENCOUNTER
----- Message from Artie Moreno sent at 1/20/2020  9:32 AM EST -----  Regarding: Dr. Rachael Jackson  General Message/Vendor Calls    Caller's first and last name: Ms. Summers Call        Reason for call: Pt is still waiting on ibuprofen (MOTRIN) 200 mg and acetaminophen (TYLENOL EXTRA STRENGTH) 500 mg tablet to be sent to the [facility] on file       Callback required yes/no and why: yes       Best contact number(s): [phone]      Details to clarify the request:      Artie Moreno

## 2020-01-20 NOTE — TELEPHONE ENCOUNTER
----- Message from Monita Bosworth sent at 1/20/2020  9:32 AM EST -----  Regarding: Dr. Belén Zee  General Message/Vendor Calls    Caller's first and last name: Ms. Miller Carrillo        Reason for call: Pt is still waiting on ibuprofen (MOTRIN) 200 mg and acetaminophen (TYLENOL EXTRA STRENGTH) 500 mg tablet to be sent to the [address] on file       Callback required yes/no and why: yes       Best contact number(s): [phone]      Details to clarify the request:      Monita Bosworth      . Requested Prescriptions     Pending Prescriptions Disp Refills    ibuprofen (MOTRIN) 200 mg tablet       Sig: Take  by mouth.  acetaminophen (TYLENOL EXTRA STRENGTH) 500 mg tablet       Sig: Take  by mouth every six (6) hours as needed for Pain.      Pharmacy not listed on file

## 2020-01-22 NOTE — TELEPHONE ENCOUNTER
Outbound call to Melina Flower ( for patient). Left message for Melina Nicoles to give a call back to the office to clarify which [facility] that medication needs to be sent to.

## 2020-01-30 ENCOUNTER — TELEPHONE (OUTPATIENT)
Dept: SLEEP MEDICINE | Age: 20
End: 2020-01-30

## 2020-01-30 NOTE — TELEPHONE ENCOUNTER
Patient never received HSAT from [facility]. We were using [facility] because it was the only in network provider at the time. Now, we are able to provide HSAT to this patient but he needs a new face to face visit. If after the (already approved [facility] HSAT) an order is placed, he would have needed a face to face visit within 6 months of the sleep study. Scheduled for 1/31 and HSAT is already approved via [address] [place] [address].

## 2020-01-31 ENCOUNTER — OFFICE VISIT (OUTPATIENT)
Dept: SLEEP MEDICINE | Age: 20
End: 2020-01-31

## 2020-01-31 VITALS
OXYGEN SATURATION: 96 % | DIASTOLIC BLOOD PRESSURE: 82 MMHG | BODY MASS INDEX: 46.65 KG/M2 | TEMPERATURE: 99.2 F | WEIGHT: 315 LBS | HEART RATE: 106 BPM | SYSTOLIC BLOOD PRESSURE: 136 MMHG | HEIGHT: 69 IN

## 2020-01-31 DIAGNOSIS — Z86.59 H/O: DEPRESSION: ICD-10-CM

## 2020-01-31 DIAGNOSIS — G47.33 OSA (OBSTRUCTIVE SLEEP APNEA): Primary | ICD-10-CM

## 2020-01-31 NOTE — PATIENT INSTRUCTIONS
[address] [facility] [place]. [address], [address] Tel.  [phone] Fax. 100 [facility] [address], [address] [facility] [address] Tel.  [phone] Fax. [phone] [address] Na[address]ra Bowling Tel.  [phone] Fax. [phone] Sleep Apnea: After Your Visit Your Care Instructions Sleep apnea occurs when you frequently stop breathing for 10 seconds or longer during sleep. It can be mild to severe, based on the number of times per hour that you stop breathing or have slowed breathing. Blocked or narrowed airways in your nose, mouth, or throat can cause sleep apnea. Your airway can become blocked when your throat muscles and tongue relax during sleep. Sleep apnea is common, occurring in 1 out of 20 individuals. Individuals having any of the following characteristics should be evaluated and treated right away due to high risk and detrimental consequences from untreated sleep apnea: 
1. Obesity 2. Congestive Heart failure 3. Atrial Fibrillation 4. Uncontrolled Hypertension 5. Type II Diabetes 6. Night-time Arrhythmias 7. Stroke 8. Pulmonary Hypertension 9. High-risk Driving Populations (pilots, truck drivers, etc.) 10. Patients Considering Weight-loss Surgery How do you know you have sleep apnea? You probably have sleep apnea if you answer 'yes' to 3 or more of the following questions: S - Have you been told that you Snore? T - Are you often Tired during the day? O - Has anyone Observed you stop breathing while sleeping? P- Do you have (or are being treated for) high blood Pressure? B - Are you obese (Body Mass Index > 35)? A - Is your Age 48years old or older? N - Is your Neck size greater than 16 inches? G - Are you male Gender? A sleep physician can prescribe a breathing device that prevents tissues in the throat from blocking your airway.  Or your doctor may recommend using a dental device (oral breathing device) to help keep your airway open. In some cases, surgery may be needed to remove enlarged tissues in the throat. Follow-up care is a key part of your treatment and safety. Be sure to make and go to all appointments, and call your doctor if you are having problems. It's also a good idea to know your test results and keep a list of the medicines you take. How can you care for yourself at home? · Lose weight, if needed. It may reduce the number of times you stop breathing or have slowed breathing. · Go to bed at the same time every night. · Sleep on your side. It may stop mild apnea. If you tend to roll onto your back, sew a pocket in the back of your pajama top. Put a tennis ball into the pocket, and stitch the pocket shut. This will help keep you from sleeping on your back. · Avoid alcohol and medicines such as sleeping pills and sedatives before bed. · Do not smoke. Smoking can make sleep apnea worse. If you need help quitting, talk to your doctor about stop-smoking programs and medicines. These can increase your chances of quitting for good. · Prop up the head of your bed 4 to 6 inches by putting bricks under the legs of the bed. · Treat breathing problems, such as a stuffy nose, caused by a cold or allergies. · Use a continuous positive airway pressure (CPAP) breathing machine if lifestyle changes do not help your apnea and your doctor recommends it. The machine keeps your airway from closing when you sleep. · If CPAP does not help you, ask your doctor whether you should try other breathing machines. A bilevel positive airway pressure machine has two types of air pressureâone for breathing in and one for breathing out. Another device raises or lowers air pressure as needed while you breathe. · If your nose feels dry or bleeds when using one of these machines, talk with your doctor about increasing moisture in the air. A humidifier may help.  
· If your nose is runny or stuffy from using a breathing machine, talk with your doctor about using decongestants or a corticosteroid nasal spray. When should you call for help? Watch closely for changes in your health, and be sure to contact your doctor if: 
· You still have sleep apnea even though you have made lifestyle changes. · You are thinking of trying a device such as CPAP. · You are having problems using a CPAP or similar machine. Where can you learn more? Go to allGreenup. Enter L245 in the search box to learn more about \"Sleep Apnea: After Your Visit. \"  
© 5017-0732 Healthwise, Incorporated. Care instructions adapted under license by [employer] (which disclaims liability or warranty for this information). This care instruction is for use with your licensed healthcare professional. If you have questions about a medical condition or this instruction, always ask your healthcare professional. [place]s any warranty or liability for your use of this information. PROPER SLEEP HYGIENE What to avoid · Do not have drinks with caffeine, such as coffee or black tea, for 8 hours before bed. · Do not smoke or use other types of tobacco near bedtime. Nicotine is a stimulant and can keep you awake. · Avoid drinking alcohol late in the evening, because it can cause you to wake in the middle of the night. · Do not eat a big meal close to bedtime. If you are hungry, eat a light snack. · Do not drink a lot of water close to bedtime, because the need to urinate may wake you up during the night. · Do not read or watch TV in bed. Use the bed only for sleeping and sexual activity. What to try · Go to bed at the same time every night, and wake up at the same time every morning. Do not take naps during the day. · Keep your bedroom quiet, dark, and cool. · Get regular exercise, but not within 3 to 4 hours of your bedtime. [facility] · Sleep on a comfortable pillow and mattress. · If watching the clock makes you anxious, turn it facing away from you so you cannot see the time. · If you worry when you lie down, start a worry book. Well before bedtime, write down your worries, and then set the book and your concerns aside. · Try meditation or other relaxation techniques before you go to bed. · If you cannot fall asleep, get up and go to another room until you feel sleepy. Do something relaxing. Repeat your bedtime routine before you go to bed again. · Make your house quiet and calm about an hour before bedtime. Turn down the lights, turn off the TV, log off the computer, and turn down the volume on music. This can help you relax after a busy day. Drowsy Driving The AddF[employer]et cites drowsiness as a causing factor in more than 580,146 police reported crashes annually, resulting in 76,000 injuries and 1,500 deaths. Other surveys suggest 55% of people polled have driven while drowsy in the past year, 23% had fallen asleep but not crashed, 3% crashed, and 2% had and accident due to drowsy driving. Who is at risk? Young Drivers: One study of drowsy driving accidents states that 55% of the drivers were under 25 years. Of those, 75% were male. Shift Workers and Travelers: People who work overnight or travel across time zones frequently are at higher risk of experiencing Circadian Rhythm Disorders. They are trying to work and function when their body is programed to sleep. Sleep Deprived: Lack of sleep has a serious impact on your ability to pay attention or focus on a task. Consistently getting less than the average of 8 hours your body needs creates partial or cumulative sleep deprivation. Untreated Sleep Disorders: Sleep Apnea, Narcolepsy, R.L.S., and other sleep disorders (untreated) prevent a person from getting enough restful sleep.  This leads to excessive daytime sleepiness and increases the risk for drowsy driving accidents by up to 7 times. Medications / Alcohol: Even over the counter medications can cause drowsiness. Medications that impair a drivers attention should have a warning label. Alcohol naturally makes you sleepy and on its own can cause accidents. Combined with excessive drowsiness its effects are amplified. Signs of Drowsy Driving: * You don't remember driving the last few miles * You may drift out of your nahomi * You are unable to focus and your thoughts wander * You may yawn more often than normal 
 * You have difficulty keeping your eyes open / nodding off * Missing traffic signs, speeding, or tailgating Prevention-  
Good sleep hygiene, lifestyle and behavioral choices have the most impact on drowsy driving. There is no substitute for sleep and the average person requires 8 hours nightly. If you find yourself driving drowsy, stop and sleep. Consider the sleep hygiene tips provided during your visit as well. Medication Refill Policy: Refills for all medications require 1 week advance notice. Please have your pharmacy fax a refill request. We are unable to fax, or call in \"controled substance\" medications and you will need to pick these prescriptions up from our office. Cocodot Activation Thank you for requesting access to [employer]. Please follow the instructions below to securely access and download your online medical record. Cocodot allows you to send messages to your doctor, view your test results, renew your prescriptions, schedule appointments, and more. How Do I Sign Up? 1. In your internet browser, go to [email or web address]. 2. Click on the First Time User? Click Here link in the Sign In box. You will see the New Member Sign Up page. 3. Enter your Cocodot Access Code exactly as it appears below. You will not need to use this code after youve completed the sign-up process.  If you do not sign up before the expiration date, you must request a new code. Dobleas Access Code: [email or web address] Expires: 3/1/2020 12:36 PM (This is the date your Dobleas access code will ) 4. Enter the last four digits of your Social Security Number (xxxx) and Date of Birth (mm/dd/yyyy) as indicated and click Submit. You will be taken to the next sign-up page. 5. Create a Dobleas ID. This will be your Dobleas login ID and cannot be changed, so think of one that is secure and easy to remember. 6. Create a Dobleas password. You can change your password at any time. 7. Enter your Password Reset Question and Answer. This can be used at a later time if you forget your password. 8. Enter your e-mail address. You will receive e-mail notification when new information is available in [address] 9. Click Sign Up. You can now view and download portions of your medical record. 10. Click the Download Summary menu link to download a portable copy of your medical information. Additional Information If you have questions, please call [phone]. Remember, Dobleas is NOT to be used for urgent needs. For medical emergencies, dial 911.

## 2020-01-31 NOTE — PROGRESS NOTES
[address] [place]. [place], [address]  Tel.  [phone]  Fax. 100 [facility] [address]  [place], [address] [facility]  Tel.  [phone]  Fax. [phone] [address] [facility] Narendra Piper   Tel.  [phone]  Fax. [phone]         Subjective:      Ifrah Maradiaga is an 20 y.o. male referred for evaluation for a sleep disorder. He complains of snoring associated with choking, tossing and turning, excessive daytime sleepiness. Symptoms began several years ago, gradually worsening since that time. He usually can fall asleep in 5 minutes with medication. Family or house members note snoring. He denies completely or partially paralyzed while falling asleep or waking up. Edelstein Sleepiness Score: 11 which reflect mild daytime drowsiness. No Known Allergies      Current Outpatient Medications:     acetaminophen (TYLENOL EXTRA STRENGTH) 500 mg tablet, Take  by mouth every six (6) hours as needed for Pain., Disp: , Rfl:     ibuprofen (MOTRIN) 200 mg tablet, Take  by mouth., Disp: , Rfl:     meloxicam (MOBIC) 15 mg tablet, Take 1 Tab by mouth daily. , Disp: 7 Tab, Rfl: 0    diclofenac EC (VOLTAREN) 75 mg EC tablet, Take 1 Tab by mouth two (2) times daily as needed for Pain., Disp: 30 Tab, Rfl: 0    traZODone (DESYREL) 100 mg tablet, TAKE 1 TABLET BY MOUTH TWICE A DAY, Disp: , Rfl: 1    sertraline (ZOLOFT) 50 mg tablet, TAKE 1 TABLET BY MOUTH EVERY DAY, Disp: , Rfl: 1     He  has a past medical history of Anxiety, Depression, Migraine, Otitis media, Psychiatric disorder, and Psychiatric problem. He  has no past surgical history on file. He family history includes Migraines in his paternal grandmother. He  reports that he has been smoking. He has been smoking about 0.25 packs per day. He has never used smokeless tobacco. He reports that he does not drink alcohol or use drugs.      Sleep Review of Systems: notable for difficulty falling asleep; frequent awakenings at night;  regular dreaming noted;  nightmares ;  early morning headaches; no memory problems; no concentration issues; no history of any automobile or occupational accidents due to daytime drowsiness. Objective:     Visit Vitals  /82 (BP 1 Location: Left arm, BP Patient Position: Sitting)   Pulse (!) 106   Temp 99.2 °F (37.3 °C)   Ht 5' 9\" (1.753 m)   Wt (!) 466 lb 14.4 oz (211.8 kg)   SpO2 96%   BMI 68.95 kg/m²         General:   Not in acute distress   Eyes:  Anicteric sclerae, no obvious strabismus   Nose:  No obvious nasal septum deviation    Oropharynx:   Class 4 oropharyngeal outlet, thick tongue base, uvula could not be seen due to low-lying soft palate, narrow tonsilo-pharyngeal pilars   Tonsils:   tonsils are not seen due to low-lying soft palate   Neck:    midline trachea   Chest/Lungs:  Equal lung expansion, clear on auscultation    CVS:  Normal rate, regular rhythm; no JVD   Skin:  Warm to touch; no obvious rashes   Neuro:  No focal deficits ; no obvious tremor    Psych:  Normal affect,  normal countenance;          Assessment:       ICD-10-CM ICD-9-CM    1. ROSANNA (obstructive sleep apnea) G47.33 327.23 SLEEP STUDY UNATTENDED, 4 CHANNEL   2. BMI 60.0-69.9, adult ([facility] [address] Z68.44 V85.44    3. H/O: depression Z86.59 V11.8          Plan:   * The patient currently has a High Risk for having sleep apnea. STOP-BANG score 6.  * Sleep testing was ordered for initial evaluation. * He was provided information on sleep apnea including coresponding risk factors and the importance of proper treatment. * Treatment options if indicated were reviewed today. Patient agrees to a trial of PAP therapy if indicated. * Counseling was provided regarding proper sleep hygiene (including effect of light on sleep), paradoxical intention, stimulus control, sleep environment safety and safe driving. * Effect of sleep disturbance on weight was reviewed.  We have recommended a dedicated weight loss through appropriate diet and an exercise regiment as significant weight reduction has been shown to reduce severity of obstructive sleep apnea.      * Patient agrees to telephone [phone]  follow-up by myself or lead sleep technologist shortly after sleep study to review results and plan final management.     (patient has given permission for a message to be left regarding test results and further management if patient cannot be cannot be reached directly). Office visit exceeded 15 minutes with counseling and direction of care taking up more than 50% of the allotted time.          Thank you for allowing us to participate in your patient's medical care. We'll keep you updated on these investigations.  Chelsi Haney MD, FAASM  Electronically signed.  01/31/20

## 2020-02-04 ENCOUNTER — TELEPHONE (OUTPATIENT)
Dept: SLEEP MEDICINE | Age: 20
End: 2020-02-04

## 2020-02-04 NOTE — TELEPHONE ENCOUNTER
Sid Caal) called stating that aDvid Galdamez stated he was unable to use the HSAT. The patient has cognitive issues and got frustrated with the equipment. Unsure how much was recorded. Advised to return the HSAT and evaluate need for possible in-lab sleep study.     Ronaldo Su,RRT,RPSGT, CSE

## 2020-02-04 NOTE — TELEPHONE ENCOUNTER
Emmy Cox is to be contacted by lead sleep technologist regarding results of Sleep Testing which was indicative of an insufficient monitoring time (91.5 minutes). Patient should return for repeat home sleep apnea testing due to presence of significant risk factors for Obstructive Sleep Apnea - STOP- BANG 6.

## 2020-02-13 ENCOUNTER — OFFICE VISIT (OUTPATIENT)
Dept: FAMILY MEDICINE CLINIC | Age: 20
End: 2020-02-13

## 2020-02-13 VITALS
OXYGEN SATURATION: 99 % | SYSTOLIC BLOOD PRESSURE: 138 MMHG | DIASTOLIC BLOOD PRESSURE: 86 MMHG | BODY MASS INDEX: 46.65 KG/M2 | WEIGHT: 315 LBS | RESPIRATION RATE: 18 BRPM | HEART RATE: 62 BPM | TEMPERATURE: 98.2 F | HEIGHT: 69 IN

## 2020-02-13 DIAGNOSIS — M54.50 LUMBAR BACK PAIN: Primary | ICD-10-CM

## 2020-02-13 NOTE — PROGRESS NOTES
Chief Complaint   Patient presents with    Follow Up Chronic Condition     1. Have you been to the ER, urgent care clinic since your last visit? Hospitalized since your last visit? No    2. Have you seen or consulted any other health care providers outside of the [address] [place] [address] since your last visit? Include any pap smears or colon screening.  No

## 2020-02-13 NOTE — LETTER
2/13/2020 12:55 PM 
 
Mr. Ani Sepulveda [address] [facility] [address] [facility] [address] TO WHOM IT MAY CONCERN: 
 
Jose Lamar can perform four ( 4 ) hours of work with no more than two ( 2 ) hours of sitting or standing at a time. Sincerely, Dinorah Contreras NP

## 2020-02-17 NOTE — PROGRESS NOTES
Assessment/Plan:     Diagnoses and all orders for this visit:    1. Lumbar back pain  He is managed with OTC NSAIDs and will continue with  weight loss as planned. 2. BMI 60.0-69.9, adult (HCC)  Continue to progress in his goals to switch towards calorie free drinks. We have discussed a goal of 3 days a week of routine aerobic exercise. He continues to reduce processed snacking and I have asked him to incorporate a fiber supplement with meals as well. Time: Greater than 25 minutes was spent with this patient face to face discussing  diagnoses, risk factors and treatment with greater than 50% of this time was spent in counseling and coordination of care of weight loss, goal setting, progress tracking, and meal preparation. Follow-up and Dispositions    · Return in about 4 weeks (around 3/12/2020) for Follow Up. Discussed expected course/resolution/complications of diagnosis in detail with patient. Medication risks/benefits/costs/interactions/alternatives discussed with patient. Pt was given after visit summary which includes diagnoses, current medications & vitals. Pt expressed understanding with the diagnosis and plan          Subjective:      Jack Rogers is a 20 y.o. male who presents for had concerns including Follow Up Chronic Condition. Here for follow up of weight loss. Is attempting self directed dieting. Is accompanied by his  today from [address] [place] [address]. Has made significant progress in the past week with improvement in reduction of soda as well as transitioning to only calorie free drinks. He is started exercising 1-2 times weekly for 20 minutes at a time. He is currently unemployed and looking for work and would like to discuss work limitations due to size. He is incorporating more fruits and vegetables and completing meal planning with the help of his .         Patient Active Problem List   Diagnosis Code    Psychiatric problem F99    Otitis media H66.90    Obesity, morbid ([facility]) E66.01       Current Outpatient Medications   Medication Sig Dispense Refill    acetaminophen (TYLENOL EXTRA STRENGTH) 500 mg tablet Take  by mouth every six (6) hours as needed for Pain.  ibuprofen (MOTRIN) 200 mg tablet Take  by mouth.  meloxicam (MOBIC) 15 mg tablet Take 1 Tab by mouth daily. 7 Tab 0    traZODone (DESYREL) 100 mg tablet TAKE 1 TABLET BY MOUTH TWICE A DAY  1    sertraline (ZOLOFT) 50 mg tablet TAKE 1 TABLET BY MOUTH EVERY DAY  1    diclofenac EC (VOLTAREN) 75 mg EC tablet Take 1 Tab by mouth two (2) times daily as needed for Pain. 30 Tab 0       No Known Allergies    ROS:   Review of Systems   Constitutional: Negative for malaise/fatigue. Eyes: Negative for blurred vision. Respiratory: Negative for shortness of breath. Cardiovascular: Negative for chest pain. Musculoskeletal: Positive for back pain. Objective:     Visit Vitals  /86   Pulse 62   Temp 98.2 °F (36.8 °C) (Oral)   Resp 18   Ht 5' 9\" (1.753 m)   Wt (!) 465 lb (210.9 kg)   SpO2 99%   BMI 68.67 kg/m²       Vitals and Nurse Documentation reviewed. Physical Exam  Constitutional:       General: He is not in acute distress. Appearance: He is obese. Cardiovascular:      Heart sounds: S1 normal and S2 normal. No murmur. No friction rub. No gallop. Pulmonary:      Effort: No respiratory distress. Breath sounds: Normal breath sounds. Skin:     General: Skin is warm and dry.    Psychiatric:         Mood and Affect: Mood and affect normal.

## 2020-02-18 NOTE — TELEPHONE ENCOUNTER
Attempted to leave voicemail to review sleep study results. Message restrictions on number per message. Please send letter to patient. Thank you.

## 2020-03-03 ENCOUNTER — TELEPHONE (OUTPATIENT)
Dept: FAMILY MEDICINE CLINIC | Age: 20
End: 2020-03-03

## 2020-03-03 NOTE — TELEPHONE ENCOUNTER
Outbound call to patients .  notified that forms should be able to be completed at 03/12/20.  verbalized understanding.

## 2020-03-03 NOTE — TELEPHONE ENCOUNTER
Pt.'s  ( April)  is calling requesting a call back in regards to know if MILESTom Campoverde can fill out an important form for [employer]. She stated that the form has a due date and it needs to be returned in soon. They can not wait until his physical exam appt. Which is on 03/31. Pt. Has an upcoming appt. On 03/12 and they wanted to know if she can do it on that day.      Best contact# [phone]

## 2020-05-28 ENCOUNTER — TELEPHONE (OUTPATIENT)
Dept: FAMILY MEDICINE CLINIC | Age: 20
End: 2020-05-28

## 2020-05-28 NOTE — TELEPHONE ENCOUNTER
Outbound call to patient no answer left a message for him to call and reschedule virtual visit on 6/1 with MILES Campoverde with any available provider or NP Nirali after 7/21

## 2020-06-02 ENCOUNTER — VIRTUAL VISIT (OUTPATIENT)
Dept: FAMILY MEDICINE CLINIC | Age: 20
End: 2020-06-02

## 2020-06-02 ENCOUNTER — TELEPHONE (OUTPATIENT)
Dept: FAMILY MEDICINE CLINIC | Age: 20
End: 2020-06-02

## 2020-06-02 VITALS — WEIGHT: 315 LBS | BODY MASS INDEX: 66.75 KG/M2

## 2020-06-02 DIAGNOSIS — E66.01 MORBID OBESITY (HCC): Primary | ICD-10-CM

## 2020-06-02 DIAGNOSIS — G43.009 MIGRAINE WITHOUT AURA AND WITHOUT STATUS MIGRAINOSUS, NOT INTRACTABLE: ICD-10-CM

## 2020-06-02 DIAGNOSIS — Z87.898 HISTORY OF SNORING: ICD-10-CM

## 2020-06-02 DIAGNOSIS — G47.00 INSOMNIA, UNSPECIFIED TYPE: ICD-10-CM

## 2020-06-02 RX ORDER — VERAPAMIL HYDROCHLORIDE 180 MG/1
180 CAPSULE, EXTENDED RELEASE ORAL DAILY
Qty: 30 CAP | Refills: 0 | Status: SHIPPED | OUTPATIENT
Start: 2020-06-02

## 2020-06-02 NOTE — PROGRESS NOTES
Juaquin Park  20 y.o. male  2000  [address]  [id number]     [facility]       Encounter Date: 6/2/2020           Established Patient Visit Note: Kush Nickerson MD    Reason for Appointment:  Chief Complaint   Patient presents with    Follow Up Chronic Condition       History of Present Illness:  History provided by patient    Juaquin Park is a 20 y.o. male who presents today for:    Morbid Obesity  Weight: 452  BMI: 66.75  Exercise: walks 2-3 times per week (15-30 minutes)  Diet: he has changed his eating to eat less junk food, eating more vegetables  Medications: none  Prior Medications: none    Headaches  Duration: entire life, but worse over the last 4-5 years  Frequency: 3-4 times per week  Duration: can last hours to days  Medications: PRN tylenol and Ibuprofen (does not work)  Symptoms: sharp pulsating sensation behind his eye  Location: temples and behind eye  Severity: 7-8/10  Aura: denies  Triggers: loud noise and playing videogames  Photophobia/Phonophobia: \"it can be for one or the other\"    Snoring and Isomnia  Patient with sleep medicine evaluation in FEb, 2020 but insufficient monitoring time  Previously took trazodone, he reports that insomnia symptoms have improved  Anxiety and Depression  Previously on Zoloft, but no longer. He reports that this has been better. Denies any SI. Review of Systems  Review of Systems   Constitutional: Negative for chills and fever. Respiratory: Negative for cough, shortness of breath and wheezing. Cardiovascular: Negative for chest pain and palpitations. Allergies: Patient has no known allergies. Medications: (Updated to reflect final medication list after visit)    Current Outpatient Medications:     verapamil ER (VERELAN) 180 mg CR capsule, Take 1 Cap by mouth daily. , Disp: 30 Cap, Rfl: 0    acetaminophen (TYLENOL EXTRA STRENGTH) 500 mg tablet, Take  by mouth every six (6) hours as needed for Pain., Disp: , Rfl:     ibuprofen (MOTRIN) 200 mg tablet, Take  by mouth., Disp: , Rfl:     meloxicam (MOBIC) 15 mg tablet, Take 1 Tab by mouth daily. , Disp: 7 Tab, Rfl: 0    diclofenac EC (VOLTAREN) 75 mg EC tablet, Take 1 Tab by mouth two (2) times daily as needed for Pain., Disp: 30 Tab, Rfl: 0    sertraline (ZOLOFT) 50 mg tablet, TAKE 1 TABLET BY MOUTH EVERY DAY, Disp: , Rfl: 1    History  Patient Care Team:  Rain Campoverde NP as PCP - General (Nurse Practitioner)  Rain Campoverde NP as PCP - [facility] Provider    Past Medical History: he has a past medical history of Anxiety, Depression, Migraine, Otitis media, Psychiatric disorder, and Psychiatric problem. Past Surgical History: he has no past surgical history on file. Family Medical History: family history includes Migraines in his paternal grandmother. Social History: he reports that he has been smoking. He has been smoking about 0.25 packs per day. He has never used smokeless tobacco. He reports that he does not drink alcohol or use drugs. Objective:   Vital Signs  Visit Vitals  Wt (!) 452 lb (205 kg)   BMI 66.75 kg/m²     Unable to obtain full set of vital signs today as patient does not have all equipment for this at home    Physical Exam  Constitutional:       General: He is not in acute distress. Appearance: Normal appearance. He is obese. He is not ill-appearing or toxic-appearing. HENT:      Head: Normocephalic. Right Ear: External ear normal.      Left Ear: External ear normal.      Nose: Nose normal.      Mouth/Throat:      Mouth: Mucous membranes are moist.   Eyes:      General: No scleral icterus. Right eye: No discharge. Left eye: No discharge. Extraocular Movements: Extraocular movements intact. Conjunctiva/sclera: Conjunctivae normal.   Neck:      Musculoskeletal: Normal range of motion.    Pulmonary:      Effort: Pulmonary effort is normal. No respiratory distress. Neurological:      Mental Status: He is alert and oriented to person, place, and time. Mental status is at baseline. Psychiatric:         Mood and Affect: Mood normal.         Behavior: Behavior normal.         Thought Content: Thought content normal.         Judgment: Judgment normal.         Assessment & Plan:      ICD-10-CM ICD-9-CM    1. Morbid obesity ([facility] [address] E66.01 278.01    2. Migraine without aura and without status migrainosus, not intractable G43.009 346.10 verapamil ER (VERELAN) 180 mg CR capsule   3. History of snoring Z87.898 V15.89    4. Insomnia, unspecified type G47.00 780.52      -Migraine: given frequency of symptoms, recommended preventive medication; discussed medication options and associated risks/benefits/side effects/precautions; patient would like to try Verapamil; will start. Also discussed that patient is high risk for ROSANNA, which can cause frequent headaches and he should follow up with sleep medicine. discussed red flag symptoms and reasons to call or go to ED  - Morbid Obesity: I have reviewed/discussed the above normal BMI with the patient. I have recommended the following interventions: dietary management education, guidance, and counseling, encourage exercise, monitor weight and prescribed dietary intake. Discussed mediation options and patient will review Sax[employer] as one possible option. Also discussed consideration of referral to bariatric medicine. Patient will consider this. - Snoring and Insomnia: Patient with significant risk factors for ROSANNA; advised him to follow up for further evaluation      I was in the office while conducting this encounter. Consent:  He and/or his healthcare decision maker is aware that this patient-initiated Telehealth encounter is a billable service, with coverage as determined by his insurance carrier. He is aware that he may receive a bill and has provided verbal consent to proceed: Yes    This virtual visit was conducted via [email or web address]. Pursuant to the emergency declaration under the [facility] waiver authority and the [employer] Act, this Virtual  Visit was conducted to reduce the patient's risk of exposure to COVID-19 and provide continuity of care for an established patient. Services were provided through a video synchronous discussion virtually to substitute for in-person clinic visit. Due to this being a TeleHealth evaluation, many elements of the physical examination are unable to be assessed. Total Time: minutes: 21-30 minutes. I have discussed the diagnosis with the patient and the intended plan as seen in the above orders. The patient has received an after-visit summary along with patient information handout. I have discussed medication side effects and warnings with the patient as well. Disposition  Follow-up and Dispositions    · Return in about 2 weeks (around 6/16/2020).            Mark Wiseman MD

## 2020-06-02 NOTE — TELEPHONE ENCOUNTER
Chief Complaint   Patient presents with    Prior Coletta Bennetts  MG CAPSULE    Prior Auth     APPROVED:  [place] Case: [id number], Status: Approved, Coverage Starts on: 6/2/2020 12:00:00 AM, Coverage Ends on: 6/2/2021 12:00:00 AM.     [facility] pharmacy faxed approval notification at [phone] with confirmation received. Patient informed.   Brian Amaya LPN

## 2021-05-11 ENCOUNTER — OFFICE VISIT (OUTPATIENT)
Dept: FAMILY MEDICINE CLINIC | Age: 21
End: 2021-05-11
Payer: MEDICAID

## 2021-05-11 VITALS
WEIGHT: 315 LBS | TEMPERATURE: 98.1 F | DIASTOLIC BLOOD PRESSURE: 92 MMHG | HEIGHT: 69 IN | RESPIRATION RATE: 16 BRPM | HEART RATE: 108 BPM | SYSTOLIC BLOOD PRESSURE: 145 MMHG | BODY MASS INDEX: 46.65 KG/M2

## 2021-05-11 DIAGNOSIS — M54.50 LUMBAR BACK PAIN: Primary | ICD-10-CM

## 2021-05-11 DIAGNOSIS — E66.01 MORBID OBESITY WITH BMI OF 70 AND OVER, ADULT (HCC): ICD-10-CM

## 2021-05-11 PROCEDURE — 99214 OFFICE O/P EST MOD 30 MIN: CPT | Performed by: NURSE PRACTITIONER

## 2021-05-11 RX ORDER — BUPROPION HYDROCHLORIDE 150 MG/1
TABLET ORAL
COMMUNITY
Start: 2021-04-10

## 2021-05-11 RX ORDER — HYDROXYZINE 50 MG/1
TABLET, FILM COATED ORAL
COMMUNITY
Start: 2021-04-09

## 2021-05-11 NOTE — PROGRESS NOTES
Sharyn Damon is a 23 y.o. male  Chief Complaint   Patient presents with    Back Pain     Health Maintenance Due   Topic Date Due    Hepatitis C Screening  Never done    Pneumococcal 0-64 years (1 of 1 - PPSV23) Never done    COVID-19 Vaccine (1) Never done    HPV Age 9Y-34Y (4 - Male 3-dose series) 05/07/2019    DTaP/Tdap/Td series (1 - Tdap) Never done     Visit Vitals  BP (!) 145/92   Pulse (!) 108   Temp 98.1 °F (36.7 °C) (Oral)   Resp 16   Ht 5' 9\" (1.753 m)   Wt (!) 524 lb (237.7 kg)   BMI 77.38 kg/m²     1. Have you been to the ER, urgent care clinic since your last visit? Hospitalized since your last visit? Yes, for Back pain [facility]    2. Have you seen or consulted any other health care providers outside of the [address] [place] [address] since your last visit? Include any pap smears or colon screening.  No

## 2021-05-11 NOTE — PROGRESS NOTES
Assessment/Plan:     Diagnoses and all orders for this visit:    1. Lumbar back pain  He will initiate the recommended treatment. We have also discussed weight loss. 2. Morbid obesity with BMI of 70 and over, adult ([facility] [address]  -     REFERRAL TO GENERAL SURGERY for evaluation of bariatric surgery. Time: 30-39 minutes was spent with this patient face to face discussing  diagnoses, risk factors and treatment with greater than 50% of this time was spent in counseling and coordination of care. Follow-up and Dispositions    · Return in about 2 months (around 7/11/2021) for Follow Up. Discussed expected course/resolution/complications of diagnosis in detail with patient. Medication risks/benefits/costs/interactions/alternatives discussed with patient. Pt was given after visit summary which includes diagnoses, current medications & vitals. Pt expressed understanding with the diagnosis and plan          Subjective:      Farhana Mitchell is a 23 y.o. male who presents for had concerns including Back Pain. He is in the [employer].  He is accompanied by his skill builder today. Hospital Follow Up  Farhana Mitchell is seen for follow up from recent ED visit to [address] [place] [address] on 5/8/2021. We requested the imaging, the notes, the records. He presented with low back pain. He did not take his prescribed medications as directed. Cain Josue He reports symptoms are improved. Medication Reconciliation reviewed today. Patient Active Problem List   Diagnosis Code    Psychiatric problem F99    Otitis media H66.90    Obesity, morbid (HCC) E66.01       Current Outpatient Medications   Medication Sig Dispense Refill    buPROPion XL (WELLBUTRIN XL) 150 mg tablet TAKE 1 TABLET BY MOUTH EVERY DAY      hydrOXYzine HCL (ATARAX) 50 mg tablet TAKE 1 TABLET BY MOUTH EVERY 4 TO 6 HOURS      verapamil ER (VERELAN) 180 mg CR capsule Take 1 Cap by mouth daily.  30 Cap 0    acetaminophen (TYLENOL EXTRA STRENGTH) 500 mg tablet Take  by mouth every six (6) hours as needed for Pain.  ibuprofen (MOTRIN) 200 mg tablet Take  by mouth.  meloxicam (MOBIC) 15 mg tablet Take 1 Tab by mouth daily. 7 Tab 0    diclofenac EC (VOLTAREN) 75 mg EC tablet Take 1 Tab by mouth two (2) times daily as needed for Pain. 30 Tab 0    sertraline (ZOLOFT) 50 mg tablet TAKE 1 TABLET BY MOUTH EVERY DAY  1       No Known Allergies    ROS:   Review of Systems   Constitutional: Negative for malaise/fatigue. Eyes: Negative for blurred vision. Respiratory: Negative for shortness of breath. Cardiovascular: Negative for chest pain. Musculoskeletal: Positive for back pain. Objective:     Visit Vitals  BP (!) 145/92   Pulse (!) 108   Temp 98.1 °F (36.7 °C) (Oral)   Resp 16   Ht 5' 9\" (1.753 m)   Wt (!) 524 lb (237.7 kg)   BMI 77.38 kg/m²       Vitals and Nurse Documentation reviewed. Physical Exam  Constitutional:       General: He is not in acute distress. Appearance: He is obese. Cardiovascular:      Heart sounds: S1 normal and S2 normal. No murmur. No friction rub. No gallop. Pulmonary:      Effort: No respiratory distress. Breath sounds: Normal breath sounds. Musculoskeletal:      Lumbar back: He exhibits decreased range of motion and pain. Skin:     General: Skin is warm and dry.    Psychiatric:         Mood and Affect: Mood and affect normal.

## 2022-02-15 ENCOUNTER — TELEPHONE (OUTPATIENT)
Dept: FAMILY MEDICINE CLINIC | Age: 22
End: 2022-02-15

## 2022-02-15 NOTE — TELEPHONE ENCOUNTER
----- Message from Urgent Group sent at 2/15/2022 11:40 AM EST -----  Subject: Appointment Request    Reason for Call: New Patient Request Appointment    QUESTIONS  Type of Appointment? New Patient/New to Provider  Reason for appointment request? Requested Provider unavailable - Rebecca Bocanegra  Additional Information for Provider? Pt's cousin is his medical proxy. She   would like to have him established with these providers as well as some   referrals for other concerns  ---------------------------------------------------------------------------  --------------  [address]  What is the best way for the office to contact you? OK to leave message on   voicemail  Preferred Call Back Phone Number? [phone]  ---------------------------------------------------------------------------  --------------  SCRIPT ANSWERS  Relationship to Patient? Other  Representative Name? Yamileth Thomas-Cousin   Additional information verified (besides Name and Date of Birth)? Last 4   SSN  Is this the first appointment to establish care for a ? No  Have you been diagnosed with, awaiting test results for, or told that you   are suspected of having COVID-19 (Coronavirus)? (If patient has tested   negative or was tested as a requirement for work, school, or travel and   not based on symptoms, answer no)? No  Within the past 10 days have you developed any of the following symptoms   (answer no if symptoms have been present longer than 10 days or began   more than 10 days ago)? Fever or Chills, Cough, Shortness of breath or   difficulty breathing, Loss of taste or smell, Sore throat, Nasal   congestion, Sneezing or runny nose, Fatigue or generalized body aches   (answer no if pain is specific to a body part e.g. back pain), Diarrhea,   Headache? No  Have you had close contact with someone with COVID-19 in the last 7 days?    No  (Service Expert  click yes below to proceed with [employer] As Usual Scheduling)?  Yes

## 2022-03-20 PROBLEM — E66.01 OBESITY, MORBID (HCC): Status: ACTIVE | Noted: 2019-01-07

## 2023-10-24 ENCOUNTER — APPOINTMENT (OUTPATIENT)
Facility: HOSPITAL | Age: 23
End: 2023-10-24
Payer: MEDICAID

## 2023-10-24 ENCOUNTER — HOSPITAL ENCOUNTER (EMERGENCY)
Facility: HOSPITAL | Age: 23
Discharge: HOME | End: 2023-10-24
Attending: EMERGENCY MEDICINE
Payer: MEDICAID

## 2023-10-24 VITALS
RESPIRATION RATE: 17 BRPM | HEIGHT: 67 IN | BODY MASS INDEX: 49.44 KG/M2 | DIASTOLIC BLOOD PRESSURE: 84 MMHG | SYSTOLIC BLOOD PRESSURE: 147 MMHG | OXYGEN SATURATION: 97 % | WEIGHT: 315 LBS | TEMPERATURE: 98.2 F | HEART RATE: 81 BPM

## 2023-10-24 DIAGNOSIS — R05.1 ACUTE COUGH: Primary | ICD-10-CM

## 2023-10-24 LAB
FLUAV AG NPH QL IA: NEGATIVE
FLUBV AG NOSE QL IA: NEGATIVE

## 2023-10-24 PROCEDURE — 99284 EMERGENCY DEPT VISIT MOD MDM: CPT

## 2023-10-24 PROCEDURE — 87804 INFLUENZA ASSAY W/OPTIC: CPT

## 2023-10-24 PROCEDURE — 71046 X-RAY EXAM CHEST 2 VIEWS: CPT

## 2023-10-24 PROCEDURE — 6370000000 HC RX 637 (ALT 250 FOR IP)

## 2023-10-24 RX ORDER — IBUPROFEN 600 MG/1
600 TABLET ORAL 3 TIMES DAILY PRN
Qty: 30 TABLET | Refills: 0 | Status: SHIPPED | OUTPATIENT
Start: 2023-10-24

## 2023-10-24 RX ORDER — ALBUTEROL SULFATE 90 UG/1
2 AEROSOL, METERED RESPIRATORY (INHALATION)
Status: COMPLETED | OUTPATIENT
Start: 2023-10-24 | End: 2023-10-24

## 2023-10-24 RX ORDER — GUAIFENESIN/DEXTROMETHORPHAN 100-10MG/5
5 SYRUP ORAL 3 TIMES DAILY PRN
Qty: 120 ML | Refills: 0 | Status: SHIPPED | OUTPATIENT
Start: 2023-10-24 | End: 2023-11-03

## 2023-10-24 RX ORDER — ACETAMINOPHEN 500 MG
1000 TABLET ORAL EVERY 8 HOURS PRN
Qty: 30 TABLET | Refills: 0 | Status: SHIPPED | OUTPATIENT
Start: 2023-10-24

## 2023-10-24 RX ORDER — ALBUTEROL SULFATE 90 UG/1
2 AEROSOL, METERED RESPIRATORY (INHALATION) EVERY 6 HOURS PRN
Qty: 18 G | Refills: 0 | Status: SHIPPED | OUTPATIENT
Start: 2023-10-24

## 2023-10-24 RX ORDER — BUTALBITAL, ACETAMINOPHEN AND CAFFEINE 50; 325; 40 MG/1; MG/1; MG/1
1 TABLET ORAL ONCE
Status: COMPLETED | OUTPATIENT
Start: 2023-10-24 | End: 2023-10-24

## 2023-10-24 RX ADMIN — ALBUTEROL SULFATE 2 PUFF: 90 AEROSOL, METERED RESPIRATORY (INHALATION) at 16:19

## 2023-10-24 RX ADMIN — BUTALBITAL, ACETAMINOPHEN, AND CAFFEINE 1 TABLET: 50; 325; 40 TABLET ORAL at 16:19

## 2023-10-24 ASSESSMENT — PAIN SCALES - GENERAL
PAINLEVEL_OUTOF10: 7
PAINLEVEL_OUTOF10: 0

## 2023-10-24 ASSESSMENT — PAIN DESCRIPTION - DESCRIPTORS: DESCRIPTORS: ACHING

## 2023-10-24 ASSESSMENT — PAIN DESCRIPTION - LOCATION: LOCATION: HEAD

## 2023-10-24 ASSESSMENT — PAIN - FUNCTIONAL ASSESSMENT: PAIN_FUNCTIONAL_ASSESSMENT: 0-10

## 2023-10-24 NOTE — ED TRIAGE NOTES
Pt comes in ambulatory in no signs of acute distress breathing is unlabored and regular. Pt reports a cough for two weeks with occasional mucous. Pt denies fevers/Chest Pain  Pt had a negative COVID test last Friday. Pt reports living in a group home but denies anyone being sick.

## 2023-10-24 NOTE — DISCHARGE INSTRUCTIONS
together.   - You should always take ibuprofen with food, as taking it on an empty stomach can increase the risk of stomach problems.   - Don't take tylenol with other medications that have acetaminophen. - Don't take ibuprofen with other NSAID medications, such as aleve, naproxen, diclofenac, advil, etc.  - Ask your primary care doctor before taking tylenol if you have liver problems. Ask your primary care doctor before taking ibuprofen if you have kidney problems. Examples of medication schedules (pick one): - Alternate these medications so that you take one medication every 4 hours. For instance, at noon take ibuprofen, then at 4pm take tylenol, then at 8pm take ibuprofen. - Take both ibuprofen and tylenol together with breakfast, lunch and dinner.   - Take tylenol right when you wake up. Take ibuprofen with breakfast. Take both ibuprofen and tylenol together with lunch. Take ibuprofen with dinner. Take tylenol right before you go to sleep. For cough, try:  Drinking 1 cup (8oz) of warm water with a teaspoon of honey twice a day. You can add a squeeze of lemon juice if you like. Over-the-counter cough/throat lozenges with menthol and honey. I like the generic pharmacy brands best    For sore throat:  Gargle warm salt water for several minutes several times a day. Prescription medications:   Robitussin: as needed for cough   Albuterol: as needed for wheezing or shortness of breath  Tylenol   Ibuprofen       Please get vaccinated for COVID-19. These fully-approved vaccines are safe. They are very effective at significantly reducing risk of severe illness and death from COVID-19. They are available at no cost to you at many locations, including [employer] (www.[employer]), health departments (https://gta-vras. [employer] L[employer]ings[employer]als. us/en-US/), pharmacies, clinics, and many other locations (https://vaccinefinder. org/).      If your blood pressure is greater than 120/80 we urge that you seek

## 2024-02-22 ENCOUNTER — OFFICE VISIT (OUTPATIENT)
Age: 24
End: 2024-02-22
Payer: MEDICAID

## 2024-02-22 VITALS
HEART RATE: 73 BPM | WEIGHT: 315 LBS | SYSTOLIC BLOOD PRESSURE: 102 MMHG | OXYGEN SATURATION: 97 % | TEMPERATURE: 97.7 F | HEIGHT: 67 IN | RESPIRATION RATE: 20 BRPM | DIASTOLIC BLOOD PRESSURE: 66 MMHG | BODY MASS INDEX: 49.44 KG/M2

## 2024-02-22 DIAGNOSIS — Z23 ENCOUNTER FOR IMMUNIZATION: ICD-10-CM

## 2024-02-22 DIAGNOSIS — Z87.898 HISTORY OF SNORING: ICD-10-CM

## 2024-02-22 DIAGNOSIS — Z00.00 ROUTINE GENERAL MEDICAL EXAMINATION AT A HEALTH CARE FACILITY: Primary | ICD-10-CM

## 2024-02-22 PROCEDURE — 99385 PREV VISIT NEW AGE 18-39: CPT | Performed by: NURSE PRACTITIONER

## 2024-02-22 PROCEDURE — 90471 IMMUNIZATION ADMIN: CPT | Performed by: NURSE PRACTITIONER

## 2024-02-22 PROCEDURE — 90674 CCIIV4 VAC NO PRSV 0.5 ML IM: CPT | Performed by: NURSE PRACTITIONER

## 2024-02-22 PROCEDURE — 90715 TDAP VACCINE 7 YRS/> IM: CPT | Performed by: NURSE PRACTITIONER

## 2024-02-22 PROCEDURE — 90472 IMMUNIZATION ADMIN EACH ADD: CPT | Performed by: NURSE PRACTITIONER

## 2024-02-22 RX ORDER — BUTALBITAL, ACETAMINOPHEN AND CAFFEINE 50; 325; 40 MG/1; MG/1; MG/1
TABLET ORAL
COMMUNITY
Start: 2024-01-04

## 2024-02-22 RX ORDER — OMEGA-3S/DHA/EPA/FISH OIL/D3 300MG-1000
CAPSULE ORAL
COMMUNITY
Start: 2024-02-14

## 2024-02-22 RX ORDER — PROPRANOLOL HYDROCHLORIDE 10 MG/1
TABLET ORAL
COMMUNITY
Start: 2024-01-29

## 2024-02-22 RX ORDER — RISPERIDONE 4 MG/1
4 TABLET ORAL DAILY
COMMUNITY

## 2024-02-22 RX ORDER — SERTRALINE HYDROCHLORIDE 100 MG/1
TABLET, FILM COATED ORAL
COMMUNITY
Start: 2024-01-27

## 2024-02-22 SDOH — ECONOMIC STABILITY: INCOME INSECURITY: HOW HARD IS IT FOR YOU TO PAY FOR THE VERY BASICS LIKE FOOD, HOUSING, MEDICAL CARE, AND HEATING?: NOT VERY HARD

## 2024-02-22 SDOH — ECONOMIC STABILITY: FOOD INSECURITY: WITHIN THE PAST 12 MONTHS, YOU WORRIED THAT YOUR FOOD WOULD RUN OUT BEFORE YOU GOT MONEY TO BUY MORE.: NEVER TRUE

## 2024-02-22 SDOH — ECONOMIC STABILITY: HOUSING INSECURITY
IN THE LAST 12 MONTHS, WAS THERE A TIME WHEN YOU DID NOT HAVE A STEADY PLACE TO SLEEP OR SLEPT IN A SHELTER (INCLUDING NOW)?: NO

## 2024-02-22 SDOH — ECONOMIC STABILITY: FOOD INSECURITY: WITHIN THE PAST 12 MONTHS, THE FOOD YOU BOUGHT JUST DIDN'T LAST AND YOU DIDN'T HAVE MONEY TO GET MORE.: NEVER TRUE

## 2024-02-22 ASSESSMENT — ENCOUNTER SYMPTOMS
EYE PAIN: 0
ABDOMINAL PAIN: 0
DIARRHEA: 0
COUGH: 0
BLOOD IN STOOL: 0
SHORTNESS OF BREATH: 0
CONSTIPATION: 0

## 2024-02-22 ASSESSMENT — PATIENT HEALTH QUESTIONNAIRE - PHQ9
SUM OF ALL RESPONSES TO PHQ9 QUESTIONS 1 & 2: 0
7. TROUBLE CONCENTRATING ON THINGS, SUCH AS READING THE NEWSPAPER OR WATCHING TELEVISION: 2
SUM OF ALL RESPONSES TO PHQ QUESTIONS 1-9: 7
10. IF YOU CHECKED OFF ANY PROBLEMS, HOW DIFFICULT HAVE THESE PROBLEMS MADE IT FOR YOU TO DO YOUR WORK, TAKE CARE OF THINGS AT HOME, OR GET ALONG WITH OTHER PEOPLE: 0
5. POOR APPETITE OR OVEREATING: 2
SUM OF ALL RESPONSES TO PHQ QUESTIONS 1-9: 7
1. LITTLE INTEREST OR PLEASURE IN DOING THINGS: 0
8. MOVING OR SPEAKING SO SLOWLY THAT OTHER PEOPLE COULD HAVE NOTICED. OR THE OPPOSITE, BEING SO FIGETY OR RESTLESS THAT YOU HAVE BEEN MOVING AROUND A LOT MORE THAN USUAL: 0
9. THOUGHTS THAT YOU WOULD BE BETTER OFF DEAD, OR OF HURTING YOURSELF: 0
SUM OF ALL RESPONSES TO PHQ QUESTIONS 1-9: 7
SUM OF ALL RESPONSES TO PHQ QUESTIONS 1-9: 7
3. TROUBLE FALLING OR STAYING ASLEEP: 2
4. FEELING TIRED OR HAVING LITTLE ENERGY: 1
2. FEELING DOWN, DEPRESSED OR HOPELESS: 0
6. FEELING BAD ABOUT YOURSELF - OR THAT YOU ARE A FAILURE OR HAVE LET YOURSELF OR YOUR FAMILY DOWN: 0

## 2024-02-22 NOTE — PROGRESS NOTES
Chief Complaint   Patient presents with    Annual Exam     \"Have you been to the ER, urgent care clinic since your last visit?  Hospitalized since your last visit?\"    YES ER [facility] YAN DX: GEOVANII 3 mth ago    “Have you seen or consulted any other health care providers outside of [facility] since your last visit?”    NO                   2/22/2024    11:14 AM   PHQ-9    Little interest or pleasure in doing things 0   Feeling down, depressed, or hopeless 0   Trouble falling or staying asleep, or sleeping too much 2   Feeling tired or having little energy 1   Poor appetite or overeating 2   Feeling bad about yourself - or that you are a failure or have let yourself or your family down 0   Trouble concentrating on things, such as reading the newspaper or watching television 2   Moving or speaking so slowly that other people could have noticed. Or the opposite - being so fidgety or restless that you have been moving around a lot more than usual 0   Thoughts that you would be better off dead, or of hurting yourself in some way 0   PHQ-2 Score 0   PHQ-9 Total Score 7   If you checked off any problems, how difficult have these problems made it for you to do your work, take care of things at home, or get along with other people? 0           Financial Resource Strain: Low Risk  (2/22/2024)    Overall Financial Resource Strain (CARDIA)     Difficulty of Paying Living Expenses: Not very hard      Food Insecurity: No Food Insecurity (2/22/2024)    Hunger Vital Sign     Worried About Running Out of Food in the Last Year: Never true     Ran Out of Food in the Last Year: Never true          Health Maintenance Due   Topic Date Due    Hepatitis B vaccine (1 of 3 - 3-dose series) Never done    COVID-19 Vaccine (1) Never done    Varicella vaccine (1 of 2 - 2-dose childhood series) Never done    Pneumococcal 0-64 years Vaccine (1 - PCV) Never done    Depression Screen  Never done    HIV screen  Never done    Hepatitis C 
Hernia: No hernia is present.   Musculoskeletal:         General: Normal range of motion.      Cervical back: Normal range of motion.   Skin:     General: Skin is warm and dry.   Neurological:      Mental Status: He is alert. Mental status is at baseline.   Psychiatric:         Mood and Affect: Mood normal.         Behavior: Behavior normal.         Judgment: Judgment normal.

## 2024-03-04 ENCOUNTER — TELEPHONE (OUTPATIENT)
Age: 24
End: 2024-03-04

## 2024-07-11 ENCOUNTER — TELEPHONE (OUTPATIENT)
Age: 24
End: 2024-07-11

## 2024-07-11 RX ORDER — SUMATRIPTAN 50 MG/1
50 TABLET, FILM COATED ORAL
Qty: 9 TABLET | Refills: 0 | Status: SHIPPED | OUTPATIENT
Start: 2024-07-11 | End: 2024-07-11

## 2024-07-11 NOTE — TELEPHONE ENCOUNTER
Returned a call to Jamari Mora about the patient medication. Mr. Mora could not verify the patients date of birth and he was not listed on the patients PHI.

## 2024-07-11 NOTE — TELEPHONE ENCOUNTER
Jamari Mora from the [facility] fiocet and tylenol need to know would the provider give him another medication for his migraine    Best call back #[phone]

## 2024-07-17 ENCOUNTER — TELEPHONE (OUTPATIENT)
Age: 24
End: 2024-07-17

## 2024-07-17 NOTE — TELEPHONE ENCOUNTER
Patient called and stated that the nurse at his assisted living facility advised him to notify you that it would be suggested that he get put on Ozempic. He has called the [employer] and they will cover the medication      Call back at [phone]

## 2024-07-19 NOTE — TELEPHONE ENCOUNTER
Two patient identifiers confirmed:  Informed the patient to rerach out to his weight loss team per Migdalia.